# Patient Record
Sex: FEMALE | Race: WHITE
[De-identification: names, ages, dates, MRNs, and addresses within clinical notes are randomized per-mention and may not be internally consistent; named-entity substitution may affect disease eponyms.]

---

## 2017-01-03 ENCOUNTER — HOSPITAL ENCOUNTER (OUTPATIENT)
Dept: HOSPITAL 45 - C.ACU | Age: 42
Setting detail: OBSERVATION
LOS: 1 days | Discharge: HOME | End: 2017-01-04
Attending: OBSTETRICS & GYNECOLOGY | Admitting: OBSTETRICS & GYNECOLOGY
Payer: COMMERCIAL

## 2017-01-03 VITALS
TEMPERATURE: 97.7 F | HEART RATE: 91 BPM | DIASTOLIC BLOOD PRESSURE: 81 MMHG | OXYGEN SATURATION: 96 % | SYSTOLIC BLOOD PRESSURE: 155 MMHG

## 2017-01-03 VITALS
TEMPERATURE: 98.06 F | SYSTOLIC BLOOD PRESSURE: 108 MMHG | HEART RATE: 79 BPM | OXYGEN SATURATION: 99 % | DIASTOLIC BLOOD PRESSURE: 69 MMHG

## 2017-01-03 VITALS
OXYGEN SATURATION: 97 % | SYSTOLIC BLOOD PRESSURE: 103 MMHG | TEMPERATURE: 97.7 F | DIASTOLIC BLOOD PRESSURE: 67 MMHG | HEART RATE: 74 BPM

## 2017-01-03 VITALS
TEMPERATURE: 97.52 F | HEART RATE: 77 BPM | SYSTOLIC BLOOD PRESSURE: 107 MMHG | DIASTOLIC BLOOD PRESSURE: 70 MMHG | OXYGEN SATURATION: 99 %

## 2017-01-03 VITALS
HEIGHT: 67 IN | WEIGHT: 189 LBS | HEIGHT: 67 IN | BODY MASS INDEX: 29.66 KG/M2 | BODY MASS INDEX: 29.66 KG/M2 | WEIGHT: 189 LBS | BODY MASS INDEX: 29.66 KG/M2

## 2017-01-03 VITALS
SYSTOLIC BLOOD PRESSURE: 110 MMHG | TEMPERATURE: 98.06 F | DIASTOLIC BLOOD PRESSURE: 65 MMHG | OXYGEN SATURATION: 96 % | HEART RATE: 72 BPM

## 2017-01-03 VITALS
HEART RATE: 75 BPM | DIASTOLIC BLOOD PRESSURE: 68 MMHG | SYSTOLIC BLOOD PRESSURE: 106 MMHG | TEMPERATURE: 97.34 F | OXYGEN SATURATION: 94 %

## 2017-01-03 VITALS
SYSTOLIC BLOOD PRESSURE: 108 MMHG | DIASTOLIC BLOOD PRESSURE: 69 MMHG | HEART RATE: 79 BPM | TEMPERATURE: 98.06 F | OXYGEN SATURATION: 99 %

## 2017-01-03 DIAGNOSIS — N92.1: ICD-10-CM

## 2017-01-03 DIAGNOSIS — K21.9: ICD-10-CM

## 2017-01-03 DIAGNOSIS — N73.6: ICD-10-CM

## 2017-01-03 DIAGNOSIS — N83.53: Primary | ICD-10-CM

## 2017-01-03 DIAGNOSIS — Z53.31: ICD-10-CM

## 2017-01-03 DIAGNOSIS — L91.8: ICD-10-CM

## 2017-01-03 LAB
BASOPHILS # BLD: 0.03 K/UL (ref 0–0.2)
BASOPHILS NFR BLD: 0.5 %
COMPLETE: YES
EOSINOPHIL NFR BLD AUTO: 190 K/UL (ref 130–400)
HCT VFR BLD CALC: 37.4 % (ref 37–47)
IG%: 0.2 %
IMM GRANULOCYTES NFR BLD AUTO: 20.2 %
LYMPHOCYTES # BLD: 1.23 K/UL (ref 1.2–3.4)
MCH RBC QN AUTO: 30.4 PG (ref 25–34)
MCHC RBC AUTO-ENTMCNC: 33.7 G/DL (ref 32–36)
MCV RBC AUTO: 90.1 FL (ref 80–100)
MONOCYTES NFR BLD: 7.1 %
NEUTROPHILS # BLD AUTO: 1.3 %
NEUTROPHILS NFR BLD AUTO: 70.7 %
PMV BLD AUTO: 10.1 FL (ref 7.4–10.4)
RBC # BLD AUTO: 4.15 M/UL (ref 4.2–5.4)
WBC # BLD AUTO: 6.08 K/UL (ref 4.8–10.8)

## 2017-01-03 RX ADMIN — HYDROMORPHONE HYDROCHLORIDE PRN MG: 2 INJECTION, SOLUTION INTRAMUSCULAR; INTRAVENOUS; SUBCUTANEOUS at 19:50

## 2017-01-03 RX ADMIN — HYDROMORPHONE HYDROCHLORIDE PRN MG: 2 INJECTION, SOLUTION INTRAMUSCULAR; INTRAVENOUS; SUBCUTANEOUS at 19:58

## 2017-01-03 RX ADMIN — OXYCODONE HYDROCHLORIDE AND ACETAMINOPHEN PRN TAB: 5; 325 TABLET ORAL at 22:40

## 2017-01-03 RX ADMIN — HYDROMORPHONE HYDROCHLORIDE PRN MG: 2 INJECTION, SOLUTION INTRAMUSCULAR; INTRAVENOUS; SUBCUTANEOUS at 19:44

## 2017-01-03 RX ADMIN — HYDROMORPHONE HYDROCHLORIDE PRN MG: 2 INJECTION, SOLUTION INTRAMUSCULAR; INTRAVENOUS; SUBCUTANEOUS at 19:36

## 2017-01-03 RX ADMIN — Medication PRN MG: at 22:40

## 2017-01-03 RX ADMIN — SODIUM CHLORIDE, SODIUM LACTATE, POTASSIUM CHLORIDE, AND CALCIUM CHLORIDE SCH MLS/HR: 600; 310; 30; 20 INJECTION, SOLUTION INTRAVENOUS at 22:42

## 2017-01-03 NOTE — HISTORY & PHYSICAL BRIDGE NOTE
H&P Re-Evaluation


Bridge Note:


I have examined the patient, reviewed the History & Physical and in the 

interval since the performance of the History & Physical I have noted the 

following changes of clinical significance: plan for removal of her left tube 

and ovary.

## 2017-01-03 NOTE — [facility] POST OPERATIVE BRIEF NOTE
Immediate Operative Summary


Operative Date


Rafa 3, 2017.





Pre-Operative Diagnosis





Left ovarian mass, suspected torsion of the left ovary.





Post-Operative Diagnosis





Same





Procedure(s) Performed





Diagnostic Laparoscopy, Exploratory laparotomy and Left


salpingo-oophorectomy





Surgeon


Dr Lira





Assistant Surgeon(s)


Dr. Brunner





Estimated Blood Loss


25cc





Findings


right tube and ovary normal.  utx surgically absent.  left ovary enlarged and 

appeared necrotic.  it was encased on bowel and adhesed to the left side of the 

vaginal cuff and the left pelvic sidewall.  appendix visualized and normal.





Fluids (cc crystalloids)


1000cc, 200 cc urine out





Specimens





a. left ovary and tube





Drains


sanders





Anesthesia


gett





Complication(s)


None





Disposition


Recovery Room / PACU

## 2017-01-04 VITALS
SYSTOLIC BLOOD PRESSURE: 126 MMHG | HEART RATE: 66 BPM | DIASTOLIC BLOOD PRESSURE: 76 MMHG | TEMPERATURE: 98.06 F | OXYGEN SATURATION: 96 %

## 2017-01-04 VITALS — OXYGEN SATURATION: 96 %

## 2017-01-04 VITALS
TEMPERATURE: 98.06 F | SYSTOLIC BLOOD PRESSURE: 126 MMHG | DIASTOLIC BLOOD PRESSURE: 76 MMHG | OXYGEN SATURATION: 96 % | HEART RATE: 66 BPM

## 2017-01-04 VITALS
DIASTOLIC BLOOD PRESSURE: 73 MMHG | OXYGEN SATURATION: 99 % | TEMPERATURE: 98.06 F | HEART RATE: 64 BPM | SYSTOLIC BLOOD PRESSURE: 113 MMHG

## 2017-01-04 VITALS
TEMPERATURE: 98.06 F | SYSTOLIC BLOOD PRESSURE: 113 MMHG | DIASTOLIC BLOOD PRESSURE: 75 MMHG | OXYGEN SATURATION: 99 % | HEART RATE: 65 BPM

## 2017-01-04 LAB
BASOPHILS # BLD: 0.02 K/UL (ref 0–0.2)
BASOPHILS NFR BLD: 0.3 %
COMPLETE: YES
EOSINOPHIL NFR BLD AUTO: 155 K/UL (ref 130–400)
HCT VFR BLD CALC: 33.9 % (ref 37–47)
IG%: 0.1 %
IMM GRANULOCYTES NFR BLD AUTO: 13.5 %
LYMPHOCYTES # BLD: 1.05 K/UL (ref 1.2–3.4)
MCH RBC QN AUTO: 30.4 PG (ref 25–34)
MCHC RBC AUTO-ENTMCNC: 33.6 G/DL (ref 32–36)
MCV RBC AUTO: 90.4 FL (ref 80–100)
MONOCYTES NFR BLD: 6.2 %
NEUTROPHILS # BLD AUTO: 0.6 %
NEUTROPHILS NFR BLD AUTO: 79.3 %
PMV BLD AUTO: 9.9 FL (ref 7.4–10.4)
RBC # BLD AUTO: 3.75 M/UL (ref 4.2–5.4)
WBC # BLD AUTO: 7.76 K/UL (ref 4.8–10.8)

## 2017-01-04 RX ADMIN — Medication PRN MG: at 08:10

## 2017-01-04 RX ADMIN — OXYCODONE HYDROCHLORIDE AND ACETAMINOPHEN PRN TAB: 5; 325 TABLET ORAL at 08:10

## 2017-01-04 RX ADMIN — SODIUM CHLORIDE, SODIUM LACTATE, POTASSIUM CHLORIDE, AND CALCIUM CHLORIDE SCH MLS/HR: 600; 310; 30; 20 INJECTION, SOLUTION INTRAVENOUS at 11:21

## 2017-01-04 RX ADMIN — Medication PRN MG: at 14:04

## 2017-01-04 RX ADMIN — OXYCODONE HYDROCHLORIDE AND ACETAMINOPHEN PRN TAB: 5; 325 TABLET ORAL at 03:24

## 2017-01-04 RX ADMIN — SODIUM CHLORIDE, SODIUM LACTATE, POTASSIUM CHLORIDE, AND CALCIUM CHLORIDE SCH MLS/HR: 600; 310; 30; 20 INJECTION, SOLUTION INTRAVENOUS at 03:17

## 2017-01-04 RX ADMIN — OXYCODONE HYDROCHLORIDE AND ACETAMINOPHEN PRN TAB: 5; 325 TABLET ORAL at 14:05

## 2017-01-04 NOTE — MEDICAL STUDENT: [facility]
Med Student OB/GYN Progress Nt


Date of Service


Jan 4, 2017.





The patient is a 41-year-old white female with a significant PMH of 

laparoscopic hysterectomy and myasthenia gravis who was sent to [facility] on 1/3/17 

for surgery when it was suspected that she had left ovarian torsion. She 

presented to the ED on 12/9/16 with severe 10/10 pain and nausea/ vomiting, and 

was determined to have a hemorrhagic cyst. She continued to have pain over the 

next few weeks and ultrasound on 1/2/17 showed arterial blood flow but no 

venous flow. In the OR diagnostic laparoscopy revealed that there was extensive 

adhesion to the vaginal cuff and bowel and so laparotomy was performed to 

remove the ovary. There were no complications. Blood loss was 25 cc. 





This morning patient states that she is having pain but it is much less severe 

than last night after surgery. She did not have any complications or issues 

with anesthesia. She was able to get some sleep. Can catheter was removed 

earlier this morning and patient was able to ambulate and void without issue. 

She has not had anything to eat except for 'a few cheerios' and has not had a 

bowel movement, but is passing gas. 





Patient denies fevers, sweats, chills, palpitations, nausea, vomiting. She 

denies calf pain or swelling.





Subjective


conversation w/ patient, physical exam


Ambulation:  ambulating normally


Voiding:  no voiding problems


Passing Gas:  Yes





Review of Systems


Constitutional:  No fever


Cardiac:  No chest pain


Abdomen:  + pain, No nausea, No vomiting


Female :  No dysuria





Objective


Vital Signs











  Date Time  Temp Pulse Resp B/P Pulse Ox O2 Delivery O2 Flow Rate FiO2


 


1/4/17 03:17 36.7 65 16 113/75 99 Room Air  


 


1/3/17 23:25      Room Air  


 


1/3/17 23:25 36.7 72 16 110/65 96 Room Air  


 


1/3/17 22:30 36.5 74 18 103/67 97 Room Air  


 


1/3/17 21:30 36.3 75 16 106/68 94 Room Air  


 


1/3/17 21:00 36.4 77 16 107/70 99 Room Air  


 


1/3/17 20:48 36.7 79 16 108/69 99 Nasal Cannula 2.0 


 


1/3/17 20:30 36.7 79 16 108/69 99 Nasal Cannula 2.0 


 


1/3/17 20:30     99 Nasal Cannula 2.0 


 


1/3/17 20:30     99 Nasal Cannula 2.0 


 


1/3/17 20:05 37.4 64 12 123/69 100 Nasal Cannula 2 


 


1/3/17 19:55  71 12 112/75 99 Nasal Cannula 2 


 


1/3/17 19:45  68 14 117/81 100 Nasal Cannula 2 


 


1/3/17 19:36 36.5 88 16 128/76 95 Nasal Cannula 2 


 


1/3/17 14:45 36.5 91 22 155/81 96 Room Air  











Physical Exam


General Appearance:  WELL-APPEARING, WD/WN


Respiratory/Chest:  chest non-tender, lungs clear, normal breath sounds


Cardiovascular:  regular rate, rhythm


Extremities:  normal range of motion, non-tender, normal inspection, no pedal 

edema, no calf tenderness


Uterus is surgically absent. 





Incision sites were not visualized but there is no erythema or swelling around 

dressings.





Laboratory Results





Last 24 Hours








Test


  1/3/17


15:15 1/4/17


04:44


 


White Blood Count 6.08 K/uL  


 


Red Blood Count 4.15 M/uL  


 


Hemoglobin 12.6 g/dL  


 


Hematocrit 37.4 %  


 


Mean Corpuscular Volume 90.1 fL  


 


Mean Corpuscular Hemoglobin 30.4 pg  


 


Mean Corpuscular Hemoglobin


Concent 33.7 g/dl 


  


 


 


Platelet Count 190 K/uL  


 


Mean Platelet Volume 10.1 fL  


 


Neutrophils (%) (Auto) 70.7 %  


 


Lymphocytes (%) (Auto) 20.2 %  


 


Monocytes (%) (Auto) 7.1 %  


 


Eosinophils (%) (Auto) 1.3 %  


 


Basophils (%) (Auto) 0.5 %  


 


Neutrophils # (Auto) 4.30 K/uL  


 


Lymphocytes # (Auto) 1.23 K/uL  


 


Monocytes # (Auto) 0.43 K/uL  


 


Eosinophils # (Auto) 0.08 K/uL  


 


Basophils # (Auto) 0.03 K/uL  


 


RDW Standard Deviation 41.7 fL  


 


RDW Coefficient of Variation 12.8 %  


 


Immature Granulocyte % (Auto) 0.2 %  


 


Immature Granulocyte # (Auto) 0.01 K/uL  











Medications











 Medications


  (Trade)  Dose


 Ordered  Sig/Krzysztof


 Route  Start Time


 Stop Time Status Last Admin


Dose Admin


 


 Lactated Ringer's


  (Lr 1000ml)  1,000 ml @ 


 125 mls/hr  Q8H


 IV  1/3/17 15:03


 1/3/17 22:24 DC 1/3/17 21:33


125 MLS/HR


 


 Hydromorphone HCl


  (Dilaudid Inj)  0.5 mg  Q5M  PRN


 IV  1/3/17 18:45


 1/3/17 23:45 DC 1/3/17 19:58


0.5 MG


 


 Bupivacaine HCl


  (Marcaine 0.5%


 MPF Inj)  6 ml  ONE  ONCE


 INJ  1/3/17 19:21


 1/3/17 19:22 DC 1/3/17 19:21


6 ML


 


 Gelatin


  (Surgifoam


 Sponge 12-7MM


  (SMALL))  1 ea  ONE  ONCE


 TOP  1/3/17 19:22


 1/3/17 19:23 DC 1/3/17 19:22


1 EA


 


 Meperidine HCl 25


 mg  25 mg  STK-MED ONCE


 .ROUTE  1/3/17 19:37


 1/3/17 19:38 DC 1/3/17 19:39


25 MG


 


 Lactated Ringer's


  (Lr 1000ml)  1,000 ml @ 


 125 mls/hr  Q8H


 IV  1/3/17 19:40


 2/2/17 19:39  1/4/17 03:17


125 MLS/HR


 


 Oxycodone/


 Acetaminophen


  (Percocet


 5-325MG Tab)  2 tab  Q4H  PRN


 PO  1/3/17 19:45


 1/17/17 19:44  1/4/17 03:24


2 TAB


 


 Ibuprofen


  (Motrin Tab)  600 mg  Q6  PRN


 PO  1/3/17 19:45


 2/2/17 19:44  1/3/17 22:40


600 MG


 


 Ketorolac


 Tromethamine 30 mg  30 mg  STK-MED ONCE


 .ROUTE  1/3/17 20:00


 1/3/17 20:01 DC 1/3/17 20:01


30 MG


 


 Cefazolin Sodium


  (Ancef 2000mg/60


 ml D5W)  60 ml @ 


 100 mls/hr  TODAY@2045  ONCE


 IV  1/3/17 20:45


 1/3/17 21:29 DC 1/3/17 21:33


100 MLS/HR











Assessment and Plan


Post-Op


Day Number:  1


Continue Routine Care:


The patient is a 41-year-old white female with a significant PMH of 

laparoscopic hysterectomy and myasthenia gravis who underwent surgery on 1/3/17 

at [facility] for left ovarian torsion. 





Continue routine care. 


Encourage ambulation as she is at risk of VTE post-op. 


Encourage fluids. 


Pain management as needed.

## 2017-01-04 NOTE — ANESTHESIOLOGY PROGRESS NOTE
Anesthesia Post Op Note


Date & Time


Jan 4, 2017 at 01:28





Vital Signs


Pain Intensity:  5.0





 Vital Signs Past 12 Hours








  Date Time  Temp Pulse Resp B/P Pulse Ox O2 Delivery O2 Flow Rate FiO2


 


1/3/17 23:25      Room Air  


 


1/3/17 23:25 36.7 72 16 110/65 96 Room Air  


 


1/3/17 22:30 36.5 74 18 103/67 97 Room Air  


 


1/3/17 21:30 36.3 75 16 106/68 94 Room Air  


 


1/3/17 21:00 36.4 77 16 107/70 99 Room Air  


 


1/3/17 20:48 36.7 79 16 108/69 99 Nasal Cannula 2.0 


 


1/3/17 20:30 36.7 79 16 108/69 99 Nasal Cannula 2.0 


 


1/3/17 20:30     99 Nasal Cannula 2.0 


 


1/3/17 20:30     99 Nasal Cannula 2.0 


 


1/3/17 20:05 37.4 64 12 123/69 100 Nasal Cannula 2 


 


1/3/17 19:55  71 12 112/75 99 Nasal Cannula 2 


 


1/3/17 19:45  68 14 117/81 100 Nasal Cannula 2 


 


1/3/17 19:36 36.5 88 16 128/76 95 Nasal Cannula 2 


 


1/3/17 14:45 36.5 91 22 155/81 96 Room Air  











Notes


Mental Status:  alert / awake / arousable, participated in evaluation


Pt Amnestic to Procedure:  Yes


Nausea / Vomiting:  adequately controlled


Pain:  adequately controlled


Airway Patency, RR, SpO2:  stable & adequate


BP & HR:  stable & adequate


Hydration State:  stable & adequate


Anesthetic Complications:  no major complications apparent

## 2017-01-04 NOTE — OB/GYN PROGRESS NOTE
OB/GYN Progress Note


Date of Service


Jan 4, 2017.





Subjective


conversation w/ patient, physical exam, lab review


Ambulation:  ambulating normally


Voiding:  no voiding problems (sanders recently removed and has voided.)


Passing Gas:  Yes


Diet Tolerance:  Clear Liquids


Pain:  controlled with oral pain meds


Notes:


Did well overnight.  no n/v.  Pain better controlled.  Explained the surgery 

and findings to the patient.





Objective


Vital Signs











  Date Time  Temp Pulse Resp B/P Pulse Ox O2 Delivery O2 Flow Rate FiO2


 


1/4/17 07:15 36.7 64 16 113/73 99 Room Air  


 


1/4/17 03:17 36.7 65 16 113/75 99 Room Air  


 


1/3/17 23:25      Room Air  


 


1/3/17 23:25 36.7 72 16 110/65 96 Room Air  


 


1/3/17 22:30 36.5 74 18 103/67 97 Room Air  


 


1/3/17 21:30 36.3 75 16 106/68 94 Room Air  


 


1/3/17 21:00 36.4 77 16 107/70 99 Room Air  


 


1/3/17 20:48 36.7 79 16 108/69 99 Nasal Cannula 2.0 


 


1/3/17 20:30 36.7 79 16 108/69 99 Nasal Cannula 2.0 


 


1/3/17 20:30     99 Nasal Cannula 2.0 


 


1/3/17 20:30     99 Nasal Cannula 2.0 


 


1/3/17 20:05 37.4 64 12 123/69 100 Nasal Cannula 2 


 


1/3/17 19:55  71 12 112/75 99 Nasal Cannula 2 


 


1/3/17 19:45  68 14 117/81 100 Nasal Cannula 2 


 


1/3/17 19:36 36.5 88 16 128/76 95 Nasal Cannula 2 


 


1/3/17 14:45 36.5 91 22 155/81 96 Room Air  











Physical Exam


General Appearance:  WELL-APPEARING, WD/WN, NO APPARENT DISTRESS


Abdomen:  normal bowel sounds, non tender, soft


Incision Description:  Clean, Dry & Intact


Extremities:  non-tender, normal inspection, no pedal edema





Laboratory Results





Last 24 Hours








Test


  1/3/17


15:15 1/4/17


04:44


 


White Blood Count 6.08 K/uL  


 


Red Blood Count 4.15 M/uL  


 


Hemoglobin 12.6 g/dL  


 


Hematocrit 37.4 %  


 


Mean Corpuscular Volume 90.1 fL  


 


Mean Corpuscular Hemoglobin 30.4 pg  


 


Mean Corpuscular Hemoglobin


Concent 33.7 g/dl 


  


 


 


Platelet Count 190 K/uL  


 


Mean Platelet Volume 10.1 fL  


 


Neutrophils (%) (Auto) 70.7 %  


 


Lymphocytes (%) (Auto) 20.2 %  


 


Monocytes (%) (Auto) 7.1 %  


 


Eosinophils (%) (Auto) 1.3 %  


 


Basophils (%) (Auto) 0.5 %  


 


Neutrophils # (Auto) 4.30 K/uL  


 


Lymphocytes # (Auto) 1.23 K/uL  


 


Monocytes # (Auto) 0.43 K/uL  


 


Eosinophils # (Auto) 0.08 K/uL  


 


Basophils # (Auto) 0.03 K/uL  


 


RDW Standard Deviation 41.7 fL  


 


RDW Coefficient of Variation 12.8 %  


 


Immature Granulocyte % (Auto) 0.2 %  


 


Immature Granulocyte # (Auto) 0.01 K/uL  











Assessment and Plan


Post-Op


Day Number:  1


Continue Routine Care:


Doing well.  Plan d/c after lunch today when criteria met.  d/c instructions 

reviewed.

## 2017-01-04 NOTE — DISCHARGE INSTRUCTIONS
Discharge Instructions


Admission


Reason for Admission:  Torsion Left Ovary





Discharge


Discharge Diagnosis / Problem:  exploratory laparotomy with removal of left 

tube and ovary.





Discharge Goals


Goal(s):  Specific Goal(s)





Activity Recommendations


Activity Limitations:  per Instructions/Follow-up section





.





Instructions / Follow-Up


Instructions / Follow-Up





ACTIVITY RECOMMENDATIONS:





Activity:





*  During the first week at home, your activity should be similar to that done 


    at the hospital prior to discharge.  Your primary activity is in-house 

walking


    interspersed with rest periods.  Preparing lunch for yourself is 

acceptable.  


    You may go up and down stairs.  Try to stay up progressively longer periods


    of time to help regain your strength more quickly. 





*  During the second week at home, activities should include some meal 

preparation,


    walking to strengthen abdominal muscles and riding in a car.  You may drive 

a car


    and make brief shopping trips at the end of the second week at home.





*  Lifting should not exceed 15-20 pounds during the first month after surgery.





*  Sexual intercourse can usually be resumed about 6 weeks after surgery 

depending


    on findings at your post-operative examinations.





Bathing:





*  Showers or baths are permissible.  Sitting in four to six inches of hot 

water 


    (sitz bath) is often comforting after vaginal surgery and is permitted at 

any time.


    A sitz bath at bedtime can also assist in a better night's sleep.








SPECIAL CARE INSTRUCTIONS:





The major discomforts related to surgery have now passed and progressive


improvement will occur.  The tight uncomfortable feeling in the abdominal, 

pelvic and


back area will gradually fade away.  Fatigue may take the longest to disappear; 

your


energy level may take several weeks to return to normal.  At times you may 

become


frustrated or impatient over not feeling as well or doing as much as you'd like

, but this


is a normal reaction to surgery and will pass with time.





Vaginal Discharge:





*  Odorous, blood-tinged or brownish discharge may be present for one to three 

weeks


    after surgery.


*  Pads should be used and not tampons. 


*  Stitches may be passed vaginally.


*  Bleeding may be somewhat increased approximately two weeks after surgery, 

which 


    is related to the stitches dissolving.


*  If bleeding becomes free flowing, notify our office at [phone].





Bowel Care:





*  Constipation after surgery is very common.  Foods that promote bowel 

activity 


    (bran, fruit, prune juice) should be included in your diet.


*  A capsule, DIALOSE-PLUS, can be purchased without a prescription and can be


    taken daily (one or two capsules) to assist in promoting bowel activity.


*  If you have had vaginal surgery involving your rectum, we will discuss this 

when


    discharged from the hospital.





Catheter or "CYSTO-CATH":





*  Approximately 80% of "bladder repair" patients will require a catheter at 

home until


    the swelling recedes.  


*  Some patients require days to weeks before adequate bladder emptying will 

resume.


*  In general, after each time you urinate, un-clamp the catheter again.  

Measure the 


    amount in the bag. 


    When this is consistently below 100cc, call the office to make an 

appointment to 


    have the catheter removed.





Temperature:





*  Any fever above 100.4 degrees F should be reported to our office at [phone].








FOLLOW-UP:





Post-Operative Appointments:





*  Individual instructions will have been given about the timing of your first 

examination, 


   but this is usually at the end of the second week home.


*  You will need to call the office at [phone] soon after discharge to 

make the 


   appointment for your post-op check-up if it has not already been scheduled--

in 4 weeks.


*  Additional information regarding activity, sexual intercourse and when to 

return to


   work will be given at this appointment.








WE WISH YOU A SPEEDY RECOVERY!





Current Hospital Diet


Patient's current hospital diet: Regular Diet





Discharge Diet


Recommended Diet:  Regular Diet





Procedures


Procedures Performed:  


Diagnostic Laparoscopy, Exploratory Laparotomy, Open Left Salpingo-Oophorectomy





Pending Studies


Studies pending at discharge:  no





Medical Emergencies








.


Who to Call and When:





Medical Emergencies:  If at any time you feel your situation is an emergency, 

please call 911 immediately.





.





Non-Emergent Contact


Non-Emergency issues call your:  Gastroenterologist





.


.





"Provider Documentation" section prepared by Bridgett Lira.





VTE Core Measure


Inpt VTE Proph given/why not?:  Treatment not indicated





[place] Drug Monitoring Program


Search Results:  patient reviewed within database

## 2017-01-04 NOTE — ANESTHESIOLOGY PROGRESS NOTE
Anesthesia Post Op Note


Date & Time


Jan 4, 2017 at 08:41





Vital Signs





 Vital Signs Past 12 Hours








  Date Time  Temp Pulse Resp B/P Pulse Ox O2 Delivery O2 Flow Rate FiO2


 


1/4/17 07:15 36.7 64 16 113/73 99 Room Air  


 


1/4/17 03:17 36.7 65 16 113/75 99 Room Air  


 


1/3/17 23:25      Room Air  


 


1/3/17 23:25 36.7 72 16 110/65 96 Room Air  


 


1/3/17 22:30 36.5 74 18 103/67 97 Room Air  


 


1/3/17 21:30 36.3 75 16 106/68 94 Room Air  


 


1/3/17 21:00 36.4 77 16 107/70 99 Room Air  


 


1/3/17 20:48 36.7 79 16 108/69 99 Nasal Cannula 2.0 











Notes


Mental Status:  alert / awake / arousable, participated in evaluation


Pt Amnestic to Procedure:  Yes


Nausea / Vomiting:  adequately controlled


Pain:  adequately controlled


Airway Patency, RR, SpO2:  stable & adequate


BP & HR:  stable & adequate


Hydration State:  stable & adequate


Anesthetic Complications:  no major complications apparent

## 2017-01-04 NOTE — OPERATIVE REPORT
DATE OF OPERATION:  01/03/2017

 

PREOPERATIVE DIAGNOSIS:  Left ovarian mass with suspected torsion of the left

ovary.

 

POSTOPERATIVE DIAGNOSIS:  Same with adhesions of the left adnexa to the large

bowel and left pelvic sidewall.

 

PROCEDURE PERFORMED:

1.  Diagnostic laparoscopy.

2.  Conversion to exploratory laparotomy.

3.  Left salpingo-oophorectomy.

 

SURGEON:  Dr. Lira.

 

ASSIST:  Dr. Brunner.

 

ESTIMATED BLOOD LOSS:  25 mL

 

FLUIDS:  1000 mL of IV fluids.

 

URINE OUTPUT:  200 mL of clear yellow urine draining from the bladder at the

end of the procedure.

 

INDICATIONS:  The patient is a 41-year-old white female with a history of

total laparoscopic hysterectomy about 2 years ago.  About 2 weeks ago, she

was in the Emergency Department with pain, was found to have a hemorrhagic 6

cm cyst on CT scan, was evaluated, treated, improved with oral pain meds. 

She presented to the office yesterday for an ultrasound noting that her pain

had not completely resolved and in fact it had been getting worse over the

past 24 hours or so.  Ultrasound at that time showed right ovarian mass that

was 6 cm, there was essentially no ovarian tissue left.  There was arterial

blood flow to the ovary but no venous return.  It was suspected that there

was ovarian torsion and she was sent to the operating room.

 

FINDINGS:  Right tube and ovary were normal.  The uterus was surgically

absent.  The left ovary was enlarged, approximately 5-6 cm and appeared

necrotic.  It was encased in bowel and adhesed to the left side of the

vaginal cuff and the left pelvic sidewall.  The appendix was visualized and

found to be normal.

 

COMPLICATIONS:  None.

 

DRAINS:  Can.

 

DISPOSITION:  To recovery room in stable condition.

 

ANESTHESIA:  General per endotracheal tube.

 

PROCEDURE IN DETAIL:  The patient was taken to the operating room where she

was identified verbally and by bracelet.  She was transferred to the

operating table where general anesthetic was induced without difficulty.  She

was then placed in a dorsal lithotomy position in [facility].  Her

hands were carefully tucked and draped at her side.  Her chest was tucked and

protected.  She was prepped and draped in normal sterile fashion.  Timeout

was held identifying correct patient, procedure and positioning.

 

A Can catheter was placed sterilely and a sterile sponge stick was placed

into the vaginal cuff.  Gloves were then changed.  Attention was then turned

to the abdomen where an infraumbilical incision was made with a knife.  The

Veress needle was placed through this, opening pressure 3 mmHg.  Abdomen

insufflated with 3 liters of carbon dioxide gas.  11 mm trocar with the

laparoscope in it was introduced into the abdomen under direct visualization.

 Then, under direct visualization, a 5 mm trocar was placed in the right and

left lower quadrants.  The right ovary was evaluated and found to be normal. 

The tube had likely been previously removed with the laparoscopy.  The left

ovary was enlarged and appeared necrotic.  It was encased in large bowel and

was adherent to the vaginal cuff to the left pelvic sidewall.  I was able to

gently tease off some of the bowel gently with a blunt probe, but given these

findings, my problems with visualization, I decided to open the patient.  The

trocars were removed, a deep stitch was placed of 0 Vicryl in the 11 mm

trocar umbilical site, and the incisions were closed with 4-0 Vicryl in a

subcuticular fashion.

 

Attention was then turned and a Pfannenstiel skin incision was made with the

knife, this was taken down to the underlying layer of fascia with the knife

and Bovie electrocautery.  Bleeding was attended to with Bovie

electrocautery.  The fascia was incised with cautery and taken out laterally

with scissors.  The superior edge of the fascial incision was grasped,

elevated, and the underlying layer of rectus muscle was taken off bluntly and

with scissors, bleeding was attended to with Bovie electrocautery.  In a

similar fashion, the inferior edge of the fascial incision was grasped,

elevated, and the underlying layer of rectus muscle was taken off bluntly and

with scissors.  The muscles were  bluntly and sharply in the

midline.  The peritoneum was entered bluntly, taken superiorly and

inferiorly, and the incision was stretched.  The patient was placed in

Trendelenburg and the bowels were packed away with moist and laparotomy

sponges.  When we were able to visualize the ovary, the 's hand was

placed into the pelvis, and gently with blunt dissection, the ovary was able

to be removed from the bowel, the vaginal cuff and the pelvic sidewall.  It

was grasped with a Glen clamp and essentially disintegrated in our hands. 

We were left with basically an ovarian stump to the IP ligament.  The IP

ligament was found to likely be free from the ureter and it was doubly

clamped, cut, and suture ligated x2.  The pelvis was then copiously irrigated

with warm normal saline.  There was some oozing at the site of the cuff and

the left pelvic sidewall, and some Gelfoam was placed over this.  Hemostasis

was noted to be good and the procedure was terminated.  The bowel was then

packed.  The patient was taken out of Trendelenburg.  The fascia was

reapproximated with 0 Vicryl meeting in the midline.  Subcuticular tissue was

irrigated and attended to with Bovie electrocautery.  When hemostasis was

assured, the skin was closed with subcuticular stitch of 4-0 Vicryl.  The

laparoscopic incisions were then infiltrated with 0.5% Marcaine.  The

instruments were removed from the vagina and the procedure was terminated. 

All sponge, lap and needle counts were correct x2.  The patient tolerated the

procedure well and was taken to the recovery room in stable condition.

 

 

I attest to the content of the Intraoperative Record and any orders documented therein. Any exceptio
ns are noted below.

## 2017-01-10 NOTE — DISCHARGE SUMMARY
ADMIT DIAGNOSES:  Suspected torsion of the left ovary.

 

HISTORY OF PRESENT ILLNESS:  This patient is a 43-year-old white female who

is known to Dr. Stanley from a [facility] done in 2014 for menorrhagia.  She had not

had GYN care since 2014 and presented to the ER on 12/09/2016.  The ER visit

was prompted by severe pelvic pain, left-sided, 10/10 with vomiting.  Her

evaluation resulted in pain meds with finding of a suspected left ovarian

hemorrhagic cyst with small hemoperitoneum.  On her followup on 12/27/2016

she continued to have persistent pain and ultrasound was planned and done the

day before admission.  That  ultrasound showed an enlarged left ovary that

was edematous with very little normal ovarian tissue in about 6 cm.  There

was arterial flow, but no venous flow could be seen.  The patient was

counseled to return to the ER if pain was severe, but instead came for

follow-up appointment on the day of admission.  She continues to feel unwell.

 The pain waxes and wanes since 12/29/2016 but she feels the last 3 days that

has worsened.  She notes no appetite.  She feels pressure or her bladder and

in her pelvis.  The pain is on her left.  The findings of the ultrasound were

discussed,  the need for LSO and the patient desires intervention as soon as

possible because of the pain.  She also reports on and off fevers.  Her last

white blood cell count in the ER 12.9 and was normal.  For the rest of the

patient's detailed history and physical, please see her dictated history and

physical.

 

ASSESSMENT:  This is a 41-year-old white female with suspected torsed left

ovary.  The options were discussed with the patient and she desires to move

toward a more emergent surgery.

 

HOSPITAL COURSE:  I was the physician on call.  Dr. Stanley spoke to me

regarding this patient and the plan.  She presented to same day surgery where

she was prepped for surgery.  Consent form was reviewed with the patient and

was signed.  We would start with a laparoscopic approach to hopefully be able

to remove the ovary that way, but she was consented for possible exploratory

laparotomy.  The patient underwent a diagnostic laparoscopy where it was

found that the  left ovary was edematous and adhesed to the left pelvic

sidewall and vaginal cuff as well as having  large bowel adhesed over it and

so the procedure was converted to exploratory laparotomy and she then she

underwent a left salpingo-oophorectomy.  The right ovary was normal.  The

right tube was surgically absent.  The left ovary was enlarged and appeared

necrotic.  It was encased in bowel and adhesed to the left side of the

vaginal cuff and pelvic sidewall.  The appendix was visualized and was

normal.  Estimated blood loss 25 mL.

 

The patient's postoperative course was uncomplicated.  She tolerated a

regular diet, ambulated, her  Can was removed and she voided.  She passed

gas.  Her pain was well controlled on oral pain medications.  She was

discharged home after lunch on postoperative day #1 with Percocet for pain

and to follow up in approximately 4 weeks for postoperative visit.  Results

will be shared with the patient upon return of  pathology.

 

 

 

BEAU

## 2017-02-13 ENCOUNTER — HOSPITAL ENCOUNTER (OUTPATIENT)
Dept: HOSPITAL 45 - C.LABSPEC | Age: 42
Discharge: HOME | End: 2017-02-13
Attending: OBSTETRICS & GYNECOLOGY
Payer: COMMERCIAL

## 2017-02-13 DIAGNOSIS — R35.0: Primary | ICD-10-CM

## 2017-02-13 LAB
APPEARANCE UR: CLEAR
BILIRUB UR-MCNC: (no result) MG/DL
COLOR UR: YELLOW
MANUAL MICROSCOPIC REQUIRED?: NO
NITRITE UR QL STRIP: (no result)
PH UR STRIP: 7.5 [PH] (ref 4.5–7.5)
REVIEW REQ?: NO
SP GR UR STRIP: 1 (ref 1–1.03)
URINE BILL WITH OR WITHOUT MIC: (no result)
URINE EPITHELIAL CELL AUTO: (no result) /LPF (ref 0–5)
UROBILINOGEN UR-MCNC: (no result) MG/DL

## 2018-02-05 ENCOUNTER — HOSPITAL ENCOUNTER (EMERGENCY)
Dept: HOSPITAL 45 - C.EDB | Age: 43
Discharge: HOME | End: 2018-02-05
Payer: COMMERCIAL

## 2018-02-05 VITALS — HEART RATE: 84 BPM | OXYGEN SATURATION: 99 %

## 2018-02-05 VITALS — SYSTOLIC BLOOD PRESSURE: 125 MMHG | DIASTOLIC BLOOD PRESSURE: 77 MMHG

## 2018-02-05 VITALS
HEIGHT: 67.52 IN | BODY MASS INDEX: 31.5 KG/M2 | BODY MASS INDEX: 31.5 KG/M2 | WEIGHT: 205.47 LBS | HEIGHT: 67.52 IN | WEIGHT: 205.47 LBS

## 2018-02-05 VITALS — TEMPERATURE: 98.06 F

## 2018-02-05 DIAGNOSIS — G70.00: ICD-10-CM

## 2018-02-05 DIAGNOSIS — R51: Primary | ICD-10-CM

## 2018-02-05 DIAGNOSIS — Z80.9: ICD-10-CM

## 2018-02-05 DIAGNOSIS — J45.909: ICD-10-CM

## 2018-02-05 DIAGNOSIS — Z90.721: ICD-10-CM

## 2018-02-05 DIAGNOSIS — Z90.710: ICD-10-CM

## 2018-02-05 DIAGNOSIS — Z98.51: ICD-10-CM

## 2018-02-05 DIAGNOSIS — R04.0: ICD-10-CM

## 2018-02-05 DIAGNOSIS — Z79.899: ICD-10-CM

## 2018-02-05 LAB
ALBUMIN SERPL-MCNC: 3.6 GM/DL (ref 3.4–5)
ALP SERPL-CCNC: 117 U/L (ref 45–117)
ALT SERPL-CCNC: 39 U/L (ref 12–78)
AST SERPL-CCNC: 35 U/L (ref 15–37)
BASOPHILS # BLD: 0.02 K/UL (ref 0–0.2)
BASOPHILS NFR BLD: 0.3 %
BUN SERPL-MCNC: 15 MG/DL (ref 7–18)
CALCIUM SERPL-MCNC: 9.2 MG/DL (ref 8.5–10.1)
CO2 SERPL-SCNC: 26 MMOL/L (ref 21–32)
CREAT SERPL-MCNC: 0.65 MG/DL (ref 0.6–1.2)
EOS ABS #: 0.08 K/UL (ref 0–0.5)
EOSINOPHIL NFR BLD AUTO: 182 K/UL (ref 130–400)
FLUAV RNA SPEC QL NAA+PROBE: (no result)
FLUBV RNA SPEC QL NAA+PROBE: (no result)
GLUCOSE SERPL-MCNC: 85 MG/DL (ref 70–99)
HCT VFR BLD CALC: 38.6 % (ref 37–47)
HGB BLD-MCNC: 12.9 G/DL (ref 12–16)
IG#: 0.01 K/UL (ref 0–0.02)
IMM GRANULOCYTES NFR BLD AUTO: 19.1 %
LYMPHOCYTES # BLD: 1.17 K/UL (ref 1.2–3.4)
MCH RBC QN AUTO: 30.7 PG (ref 25–34)
MCHC RBC AUTO-ENTMCNC: 33.4 G/DL (ref 32–36)
MCV RBC AUTO: 91.9 FL (ref 80–100)
MONO ABS #: 0.35 K/UL (ref 0.11–0.59)
MONOCYTES NFR BLD: 5.7 %
NEUT ABS #: 4.51 K/UL (ref 1.4–6.5)
NEUTROPHILS # BLD AUTO: 1.3 %
NEUTROPHILS NFR BLD AUTO: 73.4 %
PMV BLD AUTO: 10 FL (ref 7.4–10.4)
POTASSIUM SERPL-SCNC: 3.7 MMOL/L (ref 3.5–5.1)
PROT SERPL-MCNC: 7.1 GM/DL (ref 6.4–8.2)
RED CELL DISTRIBUTION WIDTH CV: 12.7 % (ref 11.5–14.5)
RED CELL DISTRIBUTION WIDTH SD: 42.6 FL (ref 36.4–46.3)
SODIUM SERPL-SCNC: 140 MMOL/L (ref 136–145)
WBC # BLD AUTO: 6.14 K/UL (ref 4.8–10.8)

## 2018-02-05 NOTE — EMERGENCY ROOM VISIT NOTE
History


Report prepared by Rajesh:  Christiano Madrid


Under the Supervision of:  Dr. Maribell Riggs D.O.


First contact with patient:  09:32


Chief Complaint:  HEADACHE


Stated Complaint:  EXTREME HEADACHE, NOSE BLEED, NAUSEA AND VOMITING





History of Present Illness


The patient is a 43 year old female who presents to the Emergency Room with 

complaints of a worsening headache that began a couple of hours ago when the 

patient woke up. She rates her pain a 9/10 in severity. She has a past medical 

history of migraines, myasthenia gravis, hysterectomy with unilateral 

oophorectomy, and asthma. When the patient woke this morning, she began to 

experience one of her typical migraines with nausea. However about an hour ago, 

her nose began to bleed from the right side which has never happened to the 

patient before. Once her nose bleed started, her headache worsened 

significantly. She then began to experience chills, diaphoresis, and vomiting. 

She was able to stop her nose bleed about 20 minutes later, but then had 

another one 10 minutes later. She is also having some visual blurring which she 

states has not happened to her in a long time. She notes that for the past two 

weeks she has been having some trouble with chills and diaphoresis secondary to 

her asthma. She also has been having some diarrhea for the past several weeks. 

She states that she is normally able to control her migraines at home with 

Imitrex. She denies any fevers, chest pain, or shortness of breath. She denies 

any recent medication changes.





   Source of History:  patient


   Onset:  a couple of hours ago


   Position:  head


   Symptom Intensity:  9/10


   Quality:  ache


   Timing:  worsening


   Associated Symptoms:  + chills, + diaphoresis, + vomiting, No fevers, No 

chest pain, No SOB


Note:


She experienced two episodes of epistaxis. She is having some visual blurring.





Review of Systems


See HPI for pertinent positives & negatives. A total of 10 systems reviewed and 

were otherwise negative.





Past Medical & Surgical


Medical Problems:


(1) Left tubo-ovarian mass


(2) Torsion of left ovary and ovarian pedicle


Surgical Problems:


(1) H/O: hysterectomy


(2) S/P tubal ligation








Family History





Cancer





Social History


Smoking Status:  Never Smoker


Alcohol Use:  occasionally


Marital Status:  


Housing Status:  lives with family


Occupation Status:  employed





Current/Historical Medications


Scheduled


Albuterol Sulfate (Proventil Hfa), 2 PUFFS OR Q4


Buspirone HCl (Buspirone HCl), 15 MG PO BID


Cetirizine (Zyrtec), 10 MG PO DAILY


Citalopram Hydrobromide (Celexa), 20 MG PO DAILY


Ibuprofen (Advil), 800 MG PO TID


Ketotifen Fumarate (Ophth) (Zaditor 0.025% Oph), 1 DROP OP BID


Magnesium Oxide (Mag-Ox), 200 MG PO BID


Montelukast Sodium (Singulair), 10 MG PO HS


Mycophenolate Mofetil (Mycophenolate Mofetil), 250 MG PO BID


Pyridostigmine Bromide (Mestinon), 60 MG PO TID


Riboflavin (Riboflavin), 400 MG PO DAILY


Sumatriptan Succinate (Imitrex Statdose), 1 DOSE SC PRN


Sumatriptan Succinate (Imitrex), 100 MG PO PRN





Scheduled PRN


Promethazine Hcl (Phenergan), 25 MG PO Q4H PRN for Nausea





Allergies


Coded Allergies:  


     Asparagus (Verified  Allergy, Unknown, THROAT CLOSES, WHEEZING, 2/5/18)





Physical Exam


Vital Signs











  Date Time  Temp Pulse Resp B/P (MAP) Pulse Ox O2 Delivery O2 Flow Rate FiO2


 


2/5/18 12:46    125/77    


 


2/5/18 11:15  84 16 126/80 99 Room Air  


 


2/5/18 10:14  72      


 


2/5/18 08:40 36.7 73 18 141/95 100 Room Air  











Physical Exam


HEENT: Head - normocephalic and atraumatic.  Pupils are equal, round, and 

reactive to light.  Extraocular eye muscles are intact and sclera are 

anicteric.  Ears -  bilaterally patent canals with noninjected tympanic 

membranes and no evidence of hemotympanum.  Nose -  moist nasal mucosa without 

discharge. There is a small amount of dried blood to the right nares. Mouth - 

moist buccal mucosa.  Oropharynx is nonerythematous and there is no tonsillar 

exudate or edema noted.


Neck: Supple; no JVD, nuchal rigidity, cervical lymphadenopathy.


Heart: Regular rate and rhythm.  There is a normal S1 and S2 with no murmurs, 

clicks, or gallops appreciated.


Lungs: Clear to auscultation bilaterally with no wheezes, rales, or rhonchi.


Abdomen: Soft, completely nontender, nondistended, with good bowel sounds.  

There are no palpable pulsatile masses or hepatosplenomegaly.  There is no 

guarding, rigidity, or rebound noted.


Extremities: No evidence of cyanosis, clubbing, or edema.  There are easily 

palpable peripheral pulses.


Neuro:The patient is awake and alert, oriented to day, time, and place.  Muscle 

strength is 5/5 in all 4 extremities.  The patient has equal  strength and 

equal pedal push and pull.  There are no cerebellar signs.





Medical Decision & Procedures


ER Provider


Diagnostic Interpretation:


Radiology results as stated below per my review and the radiologist's 

interpretation: 





TWO VIEW CHEST





CLINICAL HISTORY: Asthma exacerbation.





FINDINGS: PA and lateral chest radiographs are correlated with chest CT dated


2/24/2009. The cardiomediastinal silhouette is unremarkable.  The lungs and


pleural spaces are clear. There is no pneumothorax. The bony thorax appears


intact. Surgical anchors are noted in the left humeral head.





IMPRESSION: No active disease in the chest.





Electronically signed by:  Ken Dumas M.D.


2/5/2018 1:42 PM





Dictated Date/Time:  2/5/2018 1:42 PM





Laboratory Results


2/5/18 10:50








Red Blood Count 4.20, Mean Corpuscular Volume 91.9, Mean Corpuscular Hemoglobin 

30.7, Mean Corpuscular Hemoglobin Concent 33.4, Mean Platelet Volume 10.0, 

Neutrophils (%) (Auto) 73.4, Lymphocytes (%) (Auto) 19.1, Monocytes (%) (Auto) 

5.7, Eosinophils (%) (Auto) 1.3, Basophils (%) (Auto) 0.3, Neutrophils # (Auto) 

4.51, Lymphocytes # (Auto) 1.17, Monocytes # (Auto) 0.35, Eosinophils # (Auto) 

0.08, Basophils # (Auto) 0.02





2/5/18 10:50

















Test


  2/5/18


10:00 2/5/18


10:50


 


Influenza Type A (RT-PCR)


  Neg for Influ


A (NEG) 


 


 


Influenza Type B (RT-PCR)


  Neg for Influ


B (NEG) 


 


 


White Blood Count


  


  6.14 K/uL


(4.8-10.8)


 


Red Blood Count


  


  4.20 M/uL


(4.2-5.4)


 


Hemoglobin


  


  12.9 g/dL


(12.0-16.0)


 


Hematocrit  38.6 % (37-47) 


 


Mean Corpuscular Volume


  


  91.9 fL


()


 


Mean Corpuscular Hemoglobin


  


  30.7 pg


(25-34)


 


Mean Corpuscular Hemoglobin


Concent 


  33.4 g/dl


(32-36)


 


Platelet Count


  


  182 K/uL


(130-400)


 


Mean Platelet Volume


  


  10.0 fL


(7.4-10.4)


 


Neutrophils (%) (Auto)  73.4 % 


 


Lymphocytes (%) (Auto)  19.1 % 


 


Monocytes (%) (Auto)  5.7 % 


 


Eosinophils (%) (Auto)  1.3 % 


 


Basophils (%) (Auto)  0.3 % 


 


Neutrophils # (Auto)


  


  4.51 K/uL


(1.4-6.5)


 


Lymphocytes # (Auto)


  


  1.17 K/uL


(1.2-3.4)


 


Monocytes # (Auto)


  


  0.35 K/uL


(0.11-0.59)


 


Eosinophils # (Auto)


  


  0.08 K/uL


(0-0.5)


 


Basophils # (Auto)


  


  0.02 K/uL


(0-0.2)


 


RDW Standard Deviation


  


  42.6 fL


(36.4-46.3)


 


RDW Coefficient of Variation


  


  12.7 %


(11.5-14.5)


 


Immature Granulocyte % (Auto)  0.2 % 


 


Immature Granulocyte # (Auto)


  


  0.01 K/uL


(0.00-0.02)


 


Anion Gap


  


  9.0 mmol/L


(3-11)


 


Est Creatinine Clear Calc


Drug Dose 


  133.4 ml/min 


 


 


Estimated GFR (


American) 


  126.9 


 


 


Estimated GFR (Non-


American 


  109.5 


 


 


BUN/Creatinine Ratio  23.1 (10-20) 


 


Calcium Level


  


  9.2 mg/dl


(8.5-10.1)


 


Total Bilirubin


  


  0.3 mg/dl


(0.2-1)


 


Aspartate Amino Transf


(AST/SGOT) 


  35 U/L (15-37) 


 


 


Alanine Aminotransferase


(ALT/SGPT) 


  39 U/L (12-78) 


 


 


Alkaline Phosphatase


  


  117 U/L


()


 


Total Protein


  


  7.1 gm/dl


(6.4-8.2)


 


Albumin


  


  3.6 gm/dl


(3.4-5.0)


 


Globulin


  


  3.5 gm/dl


(2.5-4.0)


 


Albumin/Globulin Ratio  1.0 (0.9-2) 


 


Thyroid Stimulating Hormone


(TSH) 


  1.610 uIu/ml


(0.300-4.500)





Laboratory results per my review.





Medications Administered











 Medications


  (Trade)  Dose


 Ordered  Sig/Krzysztof


 Route  Start Time


 Stop Time Status Last Admin


Dose Admin


 


 Sodium Chloride  1,000 ml @ 


 999 mls/hr  Q1H1M STAT


 IV  2/5/18 09:59


 2/5/18 10:59 DC 2/5/18 09:59


999 MLS/HR


 


 Ketorolac


 Tromethamine


  (Toradol Inj)  30 mg  NOW  STAT


 IV  2/5/18 09:59


 2/5/18 10:02 DC 2/5/18 11:19


30 MG


 


 Prochlorperazine


 Edisylate


  (Compazine Inj)  10 mg  NOW  STAT


 IV  2/5/18 09:59


 2/5/18 10:02 DC 2/5/18 11:20


10 MG











Procedure


Compazine Inj 10 mg IV


Toradol Inj 30 mg IV


Sodium Chloride 1000 ml @ 999 mls/hr IV.





ED Course


  0932: Past medical records reviewed. The patient was evaluated in room C7. A 

complete history and physical exam was performed.  An IV lock was initiated and 

labs were drawn as above.  Her nose was swabbed for influenza.





0959: Ordered Compazine Inj 10 mg IV, Toradol Inj 30 mg IV, Sodium Chloride 

1000 ml @ 999 mls/hr IV. 





1232: Upon reevaluation, the patient's headache is gone, and she is feeling 

better. I discussed findings and results with her. She verbalized agreement of 

the treatment plan. She was discharged home.





Medical Decision


The patient is a 43 year old female who presents to the ED with a headache. 

Differential diagnosis includes hypertension, migraine, intracranial hemorrhage

, influenza, and dehydration. 





Laboratory Results:





No leukocytosis, stable H&H, normal renal function and LFTs, normal TSH, 

negative influenza.





This 43-year-old female patient with a history of migraine who presents to the 

emergency department with what feels like a different headache and nosebleed.  

Nose is not currently bleeding.  Upon arrival to the emergency department, the 

patient's blood pressure was slightly elevated but then returned to normal.  

She had no further epistaxis while here in the emergency department.  The 

patient had relief of her symptoms with the above medications.  The patient 

does not appear to be dehydrated and has no signs of infection.  The patient 

was told to take her Imitrex at home if the migraine returns.  Otherwise, the 

patient can follow-up with the PCP.





Medication Reconcilliation


Current Medication List:  was personally reviewed by me





Blood Pressure Screening


Patient's blood pressure:  Elevated blood pressure


Blood pressure disposition:  Elevated BP felt to be situational





Impression





 Primary Impression:  


 Headache


 Additional Impression:  


 Epistaxis





Scribe Attestation


The scribe's documentation has been prepared under my direction and personally 

reviewed by me in its entirety. I confirm that the note above accurately 

reflects all work, treatment, procedures, and medical decision making performed 

by me.





Departure Information


Dispostion


Home / Self-Care





Referrals


Saida Stanley M.D. (PCP)





Forms


HOME CARE DOCUMENTATION FORM,                                                 

               IMPORTANT VISIT INFORMATION





Patient Instructions


Headache Pain, My [facility] [place] [employer]





Additional Instructions





Rest





Take immitrex if needed.





Do not  blow nose for 24 hours





Return to the ER if symptoms worsen





Problem Qualifiers








 Primary Impression:  


 Headache


 Headache type:  unspecified  Headache chronicity pattern:  acute headache  

Intractability:  not intractable  Qualified Codes:  R51 - Headache

## 2018-02-05 NOTE — DIAGNOSTIC IMAGING REPORT
TWO VIEW CHEST



CLINICAL HISTORY: Asthma exacerbation.



FINDINGS: PA and lateral chest radiographs are correlated with chest CT dated

2/24/2009. The cardiomediastinal silhouette is unremarkable.  The lungs and

pleural spaces are clear. There is no pneumothorax. The bony thorax appears

intact. Surgical anchors are noted in the left humeral head.



IMPRESSION: No active disease in the chest.







Electronically signed by:  Ken Dumas M.D.

2/5/2018 1:42 PM



Dictated Date/Time:  2/5/2018 1:42 PM

## 2018-02-08 ENCOUNTER — HOSPITAL ENCOUNTER (EMERGENCY)
Dept: HOSPITAL 45 - C.EDB | Age: 43
Discharge: HOME | End: 2018-02-08
Payer: COMMERCIAL

## 2018-02-08 VITALS
WEIGHT: 203.27 LBS | WEIGHT: 203.27 LBS | BODY MASS INDEX: 31.9 KG/M2 | HEIGHT: 67.01 IN | BODY MASS INDEX: 31.9 KG/M2 | HEIGHT: 67.01 IN

## 2018-02-08 VITALS
TEMPERATURE: 97.7 F | SYSTOLIC BLOOD PRESSURE: 120 MMHG | OXYGEN SATURATION: 97 % | HEART RATE: 63 BPM | DIASTOLIC BLOOD PRESSURE: 67 MMHG

## 2018-02-08 DIAGNOSIS — Z98.51: ICD-10-CM

## 2018-02-08 DIAGNOSIS — Z90.710: ICD-10-CM

## 2018-02-08 DIAGNOSIS — G43.909: Primary | ICD-10-CM

## 2018-02-08 DIAGNOSIS — Z80.9: ICD-10-CM

## 2018-02-08 DIAGNOSIS — Z79.899: ICD-10-CM

## 2018-02-08 DIAGNOSIS — Z87.891: ICD-10-CM

## 2018-02-08 LAB
ALBUMIN SERPL-MCNC: 3.8 GM/DL (ref 3.4–5)
ALP SERPL-CCNC: 104 U/L (ref 45–117)
ALT SERPL-CCNC: 41 U/L (ref 12–78)
AST SERPL-CCNC: 34 U/L (ref 15–37)
BASOPHILS # BLD: 0.03 K/UL (ref 0–0.2)
BASOPHILS NFR BLD: 0.6 %
BUN SERPL-MCNC: 11 MG/DL (ref 7–18)
CALCIUM SERPL-MCNC: 9.4 MG/DL (ref 8.5–10.1)
CO2 SERPL-SCNC: 27 MMOL/L (ref 21–32)
CREAT SERPL-MCNC: 0.64 MG/DL (ref 0.6–1.2)
EOS ABS #: 0.1 K/UL (ref 0–0.5)
EOSINOPHIL NFR BLD AUTO: 182 K/UL (ref 130–400)
GLUCOSE SERPL-MCNC: 83 MG/DL (ref 70–99)
HCT VFR BLD CALC: 37.9 % (ref 37–47)
HGB BLD-MCNC: 12.7 G/DL (ref 12–16)
IG#: 0.01 K/UL (ref 0–0.02)
IMM GRANULOCYTES NFR BLD AUTO: 25.1 %
INR PPP: 1 (ref 0.9–1.1)
LYMPHOCYTES # BLD: 1.33 K/UL (ref 1.2–3.4)
MCH RBC QN AUTO: 30.6 PG (ref 25–34)
MCHC RBC AUTO-ENTMCNC: 33.5 G/DL (ref 32–36)
MCV RBC AUTO: 91.3 FL (ref 80–100)
MONO ABS #: 0.38 K/UL (ref 0.11–0.59)
MONOCYTES NFR BLD: 7.2 %
NEUT ABS #: 3.45 K/UL (ref 1.4–6.5)
NEUTROPHILS # BLD AUTO: 1.9 %
NEUTROPHILS NFR BLD AUTO: 65 %
PMV BLD AUTO: 9.4 FL (ref 7.4–10.4)
POTASSIUM SERPL-SCNC: 3.7 MMOL/L (ref 3.5–5.1)
PROT SERPL-MCNC: 7.4 GM/DL (ref 6.4–8.2)
PTT PATIENT: 26.5 SECONDS (ref 21–31)
RED CELL DISTRIBUTION WIDTH CV: 12.6 % (ref 11.5–14.5)
RED CELL DISTRIBUTION WIDTH SD: 42.3 FL (ref 36.4–46.3)
SODIUM SERPL-SCNC: 139 MMOL/L (ref 136–145)
WBC # BLD AUTO: 5.3 K/UL (ref 4.8–10.8)

## 2018-02-08 NOTE — EMERGENCY ROOM VISIT NOTE
History


First contact with patient:  14:01


Chief Complaint:  HEADACHE


Stated Complaint:  NOSE BLEEDS,SEVERE HEADACHE, R EYE AND NECK PAIN





History of Present Illness


The patient is a 43 year old female who presents to the Emergency Room with 

complaints of severe headache for the past several days.  She was seen this 

week in the emergency department for this complaint.  She was given IV 

medication, which initially did improve her discomfort.  She states the next 

day her pain returned, and today she had a nosebleed for about 20 minutes, 

prompting her presentation to the department.  Most of her discomfort is behind 

the right eye and down the right side neck.  She is not having vision changes 

or hearing changes.  She has not had numbness or paresthesias.  She admits that 

she has a chronic history of migraines, but the pain is worse today than 

normal.  She has an appointment in 5 days with her neurologist.  The patient 

does have Imitrex at home, but did not take his medication.  This morning she 

did take ibuprofen, which did help initially.  She has not had fever or chills.

  No abdominal pain.  She does complain of intermittent left side chest "twinge

" that is very infrequent, but is new.  She rates her pain a 10/10.





Review of Systems


More than 10 systems were reviewed and otherwise negative with the exception of 

history of present illness.





Past Medical/Surgical History


Medical Problems:


(1) Left tubo-ovarian mass


(2) Torsion of left ovary and ovarian pedicle


Surgical Problems:


(1) H/O: hysterectomy


(2) S/P tubal ligation








Family History





Cancer





Social History


Smoking Status:  Former Smoker


Alcohol Use:  occasionally


Marital Status:  


Housing Status:  lives with family


Occupation Status:  employed





Current/Historical Medications


Scheduled


Albuterol Sulfate (Proventil Hfa), 2 PUFFS OR Q4


Buspirone HCl (Buspirone HCl), 15 MG PO BID


Cetirizine (Zyrtec), 10 MG PO DAILY


Citalopram Hydrobromide (Celexa), 20 MG PO DAILY


Ibuprofen (Advil), 800 MG PO TID


Ketotifen Fumarate (Ophth) (Zaditor 0.025% Oph), 1 DROP OP BID


Magnesium Oxide (Mag-Ox), 200 MG PO BID


Montelukast Sodium (Singulair), 10 MG PO HS


Mycophenolate Mofetil (Mycophenolate Mofetil), 250 MG PO BID


Pyridostigmine Bromide (Mestinon), 60 MG PO TID


Riboflavin (Riboflavin), 400 MG PO DAILY


Sumatriptan Succinate (Imitrex Statdose), 1 DOSE SC PRN


Sumatriptan Succinate (Imitrex), 100 MG PO PRN





Scheduled PRN


Promethazine Hcl (Phenergan), 25 MG PO Q4H PRN for Nausea





Physical Exam


Vital Signs











  Date Time  Temp Pulse Resp B/P (MAP) Pulse Ox O2 Delivery O2 Flow Rate FiO2


 


2/8/18 17:48 36.5 63 14 120/67 97   


 


2/8/18 16:46  63 14     


 


2/8/18 16:41  70 13     


 


2/8/18 16:36  76 15     


 


2/8/18 16:31  67 15     


 


2/8/18 16:26  66 14     


 


2/8/18 16:21  67 15     


 


2/8/18 16:16  70 17     


 


2/8/18 16:11  68 14     


 


2/8/18 16:06  73 17     


 


2/8/18 16:01  67 17     


 


2/8/18 15:56  66 17     


 


2/8/18 15:51  67 17     


 


2/8/18 15:46  72 19     


 


2/8/18 15:43      Room Air  


 


2/8/18 15:42    120/67    


 


2/8/18 15:42  74 16 120/67 97 Room Air  


 


2/8/18 15:41  75 15     


 


2/8/18 15:26  68 17     


 


2/8/18 15:21  71 16     


 


2/8/18 15:16  78 10     


 


2/8/18 15:11  81 17     


 


2/8/18 15:06  77 15     


 


2/8/18 15:01  77 16     


 


2/8/18 14:56  84 20     


 


2/8/18 14:51  71 14     


 


2/8/18 14:48  64      


 


2/8/18 13:36 36.5 62 20 143/100 94 Room Air  











Physical Exam


VITALS: Vitals are noted on the nurse's note and reviewed by myself.  Vital 

signs stable.


GENERAL: Well-developed, well-nourished, white female, who is in no acute 

distress and resting comfortably in a well lit ER room. Patient is cooperative 

with the examination.


HEAD: Normocephalic atraumatic.  


EARS: External ear normal. External auditory canals clear, tympanic membranes 

pearly gray without erythema or effusion bilaterally.


EYES: Pupils equal round and reactive to light and accommodation.  Conjunctivae 

without injection, sclerae without icterus.  Extraocular movements intact.  


NOSE: Patent, turbinates without inflammation or discharge.  No epistaxis noted.


MOUTH: Mucous membranes moist.  Tonsils are not enlarged.  Pharynx without 

erythema, blood, or exudate.  Uvula midline.  Airway patent.   


NECK: Supple without nuchal rigidity.  No lymphadenopathy.  No thyromegaly.  

Cervical spine is nontender.  


HEART: Regular rate and rhythm without murmurs gallops or rubs.


LUNGS: Clear to auscultation bilaterally without wheezes, rales or rhonchi.  No 

retractions or accessory muscle use.


ABDOMEN: Positive normal bowel sounds x 4.  Soft, nontender, without masses or 

organomegaly.  No guarding or rebound tenderness.


MUSCULOSKELETAL: No muscle atrophy, erythema, or edema noted.  


NEURO: Patient was alert and oriented to person place and time. CN II through 

XII grossly intact. No focal neurological deficits.





Medical Decision & Procedures


ER Provider


Diagnostic Interpretation:











CT SCAN OF THE BRAIN WITHOUT IV CONTRAST





CLINICAL HISTORY: Headache.





COMPARISON STUDY:  MRI of the brain dated 2/4/2009.





TECHNIQUE: Unenhanced axial CT scan of the brain is performed from the vertex to


the skull base.  A dose lowering technique was utilized adhering to the


principles of ALARA.





CT DOSE: 638.56 mGycm





FINDINGS:





Brain parenchyma: The brain parenchyma is normal in appearance. There is no


hemorrhage, mass effect, or evidence of acute territorial ischemia by CT


criteria. Gray-white matter is preserved. No extra-axial fluid collection is


seen.





Ventricles, sulci, cisterns: Normal in configuration.





Intracranial vasculature: The visualized intracranial vasculature at the skull


base is normal in appearance.





Calvarium: Unremarkable.





Sinuses and mastoids: The visualized paranasal sinuses are clear. The mastoid


air cells are well pneumatized.





Orbits: The bony orbits are grossly intact.








IMPRESSION: No acute intracranial abnormality.











Laboratory Results


2/8/18 15:26








Red Blood Count 4.15, Mean Corpuscular Volume 91.3, Mean Corpuscular Hemoglobin 

30.6, Mean Corpuscular Hemoglobin Concent 33.5, Mean Platelet Volume 9.4, 

Neutrophils (%) (Auto) 65.0, Lymphocytes (%) (Auto) 25.1, Monocytes (%) (Auto) 

7.2, Eosinophils (%) (Auto) 1.9, Basophils (%) (Auto) 0.6, Neutrophils # (Auto) 

3.45, Lymphocytes # (Auto) 1.33, Monocytes # (Auto) 0.38, Eosinophils # (Auto) 

0.10, Basophils # (Auto) 0.03





2/8/18 15:26

















Test


  2/8/18


13:26 2/8/18


15:26 2/8/18


17:00


 


Troponin I


  < 0.015 ng/ml


(0-0.045) 


  


 


 


White Blood Count


  


  5.30 K/uL


(4.8-10.8) 


 


 


Red Blood Count


  


  4.15 M/uL


(4.2-5.4) 


 


 


Hemoglobin


  


  12.7 g/dL


(12.0-16.0) 


 


 


Hematocrit  37.9 % (37-47)  


 


Mean Corpuscular Volume


  


  91.3 fL


() 


 


 


Mean Corpuscular Hemoglobin


  


  30.6 pg


(25-34) 


 


 


Mean Corpuscular Hemoglobin


Concent 


  33.5 g/dl


(32-36) 


 


 


Platelet Count


  


  182 K/uL


(130-400) 


 


 


Mean Platelet Volume


  


  9.4 fL


(7.4-10.4) 


 


 


Neutrophils (%) (Auto)  65.0 %  


 


Lymphocytes (%) (Auto)  25.1 %  


 


Monocytes (%) (Auto)  7.2 %  


 


Eosinophils (%) (Auto)  1.9 %  


 


Basophils (%) (Auto)  0.6 %  


 


Neutrophils # (Auto)


  


  3.45 K/uL


(1.4-6.5) 


 


 


Lymphocytes # (Auto)


  


  1.33 K/uL


(1.2-3.4) 


 


 


Monocytes # (Auto)


  


  0.38 K/uL


(0.11-0.59) 


 


 


Eosinophils # (Auto)


  


  0.10 K/uL


(0-0.5) 


 


 


Basophils # (Auto)


  


  0.03 K/uL


(0-0.2) 


 


 


RDW Standard Deviation


  


  42.3 fL


(36.4-46.3) 


 


 


RDW Coefficient of Variation


  


  12.6 %


(11.5-14.5) 


 


 


Immature Granulocyte % (Auto)  0.2 %  


 


Immature Granulocyte # (Auto)


  


  0.01 K/uL


(0.00-0.02) 


 


 


Prothrombin Time


  


  10.4 SECONDS


(9.0-12.0) 


 


 


Prothromb Time International


Ratio 


  1.0 (0.9-1.1) 


  


 


 


Activated Partial


Thromboplast Time 


  26.5 SECONDS


(21.0-31.0) 


 


 


Partial Thromboplastin Ratio  1.0  


 


Anion Gap


  


  7.0 mmol/L


(3-11) 


 


 


Est Creatinine Clear Calc


Drug Dose 


  133.5 ml/min 


  


 


 


Estimated GFR (


American) 


  127.6 


  


 


 


Estimated GFR (Non-


American 


  110.1 


  


 


 


BUN/Creatinine Ratio  17.3 (10-20)  


 


Calcium Level


  


  9.4 mg/dl


(8.5-10.1) 


 


 


Magnesium Level


  


  2.2 mg/dl


(1.8-2.4) 


 


 


Total Bilirubin


  


  0.4 mg/dl


(0.2-1) 


 


 


Aspartate Amino Transf


(AST/SGOT) 


  34 U/L (15-37) 


  


 


 


Alanine Aminotransferase


(ALT/SGPT) 


  41 U/L (12-78) 


  


 


 


Alkaline Phosphatase


  


  104 U/L


() 


 


 


Total Protein


  


  7.4 gm/dl


(6.4-8.2) 


 


 


Albumin


  


  3.8 gm/dl


(3.4-5.0) 


 


 


Globulin


  


  3.6 gm/dl


(2.5-4.0) 


 


 


Albumin/Globulin Ratio  1.0 (0.9-2)  


 


Thyroid Stimulating Hormone


(TSH) 


  2.100 uIu/ml


(0.300-4.500) 


 


 


Urine Color   YELLOW 


 


Urine Appearance   CLEAR (CLEAR) 


 


Urine pH   6.0 (4.5-7.5) 


 


Urine Specific Gravity


  


  


  1.010


(1.000-1.030)


 


Urine Protein   NEG (NEG) 


 


Urine Glucose (UA)   NEG (NEG) 


 


Urine Ketones   NEG (NEG) 


 


Urine Occult Blood   NEG (NEG) 


 


Urine Nitrite   NEG (NEG) 


 


Urine Bilirubin   NEG (NEG) 


 


Urine Urobilinogen   NEG (NEG) 


 


Urine Leukocyte Esterase   NEG (NEG) 


 


Urine Pregnancy Test   NEG (NEG) 











Medications Administered











 Medications


  (Trade)  Dose


 Ordered  Sig/Krzysztof


 Route  Start Time


 Stop Time Status Last Admin


Dose Admin


 


 Diphenhydramine


 HCl


  (Benadryl Inj)  50 mg  NOW  STAT


 IV  2/8/18 14:17


 2/8/18 14:19 DC 2/8/18 14:41


50 MG


 


 Prochlorperazine


 Edisylate


  (Compazine Inj)  10 mg  NOW  STAT


 IV  2/8/18 14:17


 2/8/18 14:19 DC 2/8/18 14:41


10 MG


 


 Sodium Chloride  1,000 ml @ 


 999 mls/hr  Q1H1M ONCE


 IV  2/8/18 14:30


 2/8/18 15:30 DC 2/8/18 14:43


999 MLS/HR


 


 Ketorolac


 Tromethamine


  (Toradol Inj)  30 mg  NOW  STAT


 IV  2/8/18 14:17


 2/8/18 14:19 DC 2/8/18 14:41


30 MG


 


 Dexamethasone


 Sodium Phosphate


 10 mg/Syringe  2.5 ml @ 1


 mls/min  NOW  ONCE


 IV  2/8/18 16:15


 2/8/18 16:17 DC 2/8/18 16:41


1 MLS/MIN


 


 Magnesium Sulfate


  (Magnesium


 Sulfate)  1 gm  NOW  STAT


 IV  2/8/18 16:04


 2/8/18 16:07 DC 2/8/18 16:40


1 GM


 


 Sumatriptan


 Succinate


  (Imitrex Sq Inj)  6 mg  NOW  STAT


 SQ  2/8/18 16:04


 2/8/18 16:07 DC 2/8/18 16:41


6 MG











ED Course


Physical exam and history were performed. Nursing notes, EMR, and Medication 

List were personally reviewed. 





Patient appears to have a history of migraines with a persistent migraine the 

past several days.  She states this is different from her normal migraines.  

She does not appear toxic on examination, and is without signs of meningitis or 

encephalitis.  IV access was established and labs were obtained.  The patient 

was hydrated and medicated as above.  Because of her symptoms I elected to 

perform a CT scan of the head.





The patient's blood work is as above and was reviewed.  She does not have a 

significantly elevated white blood cell count, gross anemia, bandemia, or 

significant electrolyte imbalance.  Transaminases are nondiagnostic.  Urine is 

without evidence of infection.  The patient CT scan is as above and does not 

show obvious intracranial bleed or other abnormality.





On reevaluation throughout her stay, she did have improvement of her symptoms.  

She does have an appointment next week with neurology, which appears reasonable

, and she was encouraged to keep this appointment.  She has felt stable for 

discharge home, and will be discharged home in the care of a female .  

She was pleased with this plan and rated her discomfort a 2/10 at time of 

departure.





The chart was completed utilizing Dragon Speech Voice Recognition Software. 

Grammatical errors, random word insertions, pronoun errors, and incomplete 

sentences are an occasional consequence of this system due to software 

limitations, ambient noise, and hardware issues. Any formal questions or 

concerns about the content, text, or information contained within the body of 

this dictation should be directly addressed to the provider for clarification.


.





Medical Decision


The differential diagnosis includes, but is not limited to: acute intracranial 

bleed, meningitis, encephalitis, mass or mass effect, sinusitis, infection, 

tumor, headache, temporal arteritis and carbon monoxide exposure, and migraine.





Impression





 Primary Impression:  


 Migraine





Departure Information


Referrals


Saida Stanley M.D. (PCP)





Patient Instructions


[facility]

## 2018-02-08 NOTE — DIAGNOSTIC IMAGING REPORT
CT SCAN OF THE BRAIN WITHOUT IV CONTRAST



CLINICAL HISTORY: Headache.



COMPARISON STUDY:  MRI of the brain dated 2/4/2009.



TECHNIQUE: Unenhanced axial CT scan of the brain is performed from the vertex to

the skull base.  A dose lowering technique was utilized adhering to the

principles of ALARA.



CT DOSE: 638.56 mGycm



FINDINGS:



Brain parenchyma: The brain parenchyma is normal in appearance. There is no

hemorrhage, mass effect, or evidence of acute territorial ischemia by CT

criteria. Gray-white matter is preserved. No extra-axial fluid collection is

seen.



Ventricles, sulci, cisterns: Normal in configuration.



Intracranial vasculature: The visualized intracranial vasculature at the skull

base is normal in appearance.



Calvarium: Unremarkable.



Sinuses and mastoids: The visualized paranasal sinuses are clear. The mastoid

air cells are well pneumatized.



Orbits: The bony orbits are grossly intact.





IMPRESSION: No acute intracranial abnormality.







Electronically signed by:  Ken Dumas M.D.

2/8/2018 3:39 PM



Dictated Date/Time:  2/8/2018 3:38 PM

## 2018-02-12 ENCOUNTER — HOSPITAL ENCOUNTER (OUTPATIENT)
Dept: HOSPITAL 45 - C.ULTRBC | Age: 43
Discharge: HOME | End: 2018-02-12
Attending: INTERNAL MEDICINE
Payer: COMMERCIAL

## 2018-02-12 DIAGNOSIS — I80.8: ICD-10-CM

## 2018-02-12 DIAGNOSIS — L03.113: Primary | ICD-10-CM

## 2018-02-12 NOTE — DIAGNOSTIC IMAGING REPORT
******** ADDENDUM ********





The initial report is in error. No evidence for deep venous thrombosis. Study is

positive for focal superficial thrombophlebitis







Electronically signed by:  Jonh Patel M.D.

2/12/2018 4:28 PM



Dictated Date/Time:  2/12/2018 4:28 PM



******** ORIGINAL REPORT ********





R VENOUS DOPPLER UPR EXT UNIL



HISTORY: Pain. Edema.  CELLULITIS RT HAND, PAIN/SWELLING



COMPARISON STUDY:  None.



FINDINGS: Superficial thrombophlebitis dorsal aspect of the hand. Thrombus

within one of the 2 duplicated brachial veins. All remaining venous structures

are unremarkable. 



IMPRESSION:  



1. Focal superficial thrombophlebitis dorsal aspect of the hand.





2. Focal deep venous thrombosis] in one of the 2 brachial veins



The above report was generated using voice recognition software.  It may contain

grammatical, syntax or spelling errors.







Electronically signed by:  John Patel M.D.

2/12/2018 4:24 PM



Dictated Date/Time:  2/12/2018 4:21 PM